# Patient Record
Sex: MALE | Race: WHITE | Employment: FULL TIME | URBAN - NONMETROPOLITAN AREA
[De-identification: names, ages, dates, MRNs, and addresses within clinical notes are randomized per-mention and may not be internally consistent; named-entity substitution may affect disease eponyms.]

---

## 2019-09-15 ENCOUNTER — HOSPITAL ENCOUNTER (EMERGENCY)
Facility: HOSPITAL | Age: 24
Discharge: HOME | End: 2019-09-15
Attending: NURSE PRACTITIONER | Admitting: NURSE PRACTITIONER
Payer: COMMERCIAL

## 2019-09-15 VITALS
SYSTOLIC BLOOD PRESSURE: 157 MMHG | OXYGEN SATURATION: 98 % | RESPIRATION RATE: 16 BRPM | TEMPERATURE: 98.6 F | DIASTOLIC BLOOD PRESSURE: 81 MMHG

## 2019-09-15 DIAGNOSIS — S61.411A LACERATION OF RIGHT HAND WITHOUT FOREIGN BODY, INITIAL ENCOUNTER: Primary | ICD-10-CM

## 2019-09-15 PROCEDURE — G0463 HOSPITAL OUTPT CLINIC VISIT: HCPCS

## 2019-09-15 PROCEDURE — 99213 OFFICE O/P EST LOW 20 MIN: CPT | Mod: Z6 | Performed by: NURSE PRACTITIONER

## 2019-09-15 ASSESSMENT — ENCOUNTER SYMPTOMS
CHILLS: 0
VOMITING: 0
SHORTNESS OF BREATH: 0
FEVER: 0
NAUSEA: 0
WOUND: 1
COLOR CHANGE: 0

## 2019-09-15 NOTE — ED AVS SNAPSHOT
[facility]  [address] [place] [address]  [place] [address]  Phone:  [phone]                                    Carlos Jaquez   MRN: [id number]    Department:  [facility]   Date of Visit:  9/15/2019           After Visit Summary Signature Page    I have received my discharge instructions, and my questions have been answered. I have discussed any challenges I see with this plan with the nurse or doctor.    ..........................................................................................................................................  Patient/Patient Representative Signature      ..........................................................................................................................................  Patient Representative Print Name and Relationship to Patient    ..................................................               ................................................  Date                                   Time    ..........................................................................................................................................  Reviewed by Signature/Title    ...................................................              ..............................................  Date                                               Time          22EPIC Rev 08/18

## 2019-09-16 NOTE — ED PROVIDER NOTES
History     Chief Complaint   Patient presents with     Laceration     rt hand laceration     HPI  Carlos Jaquez is a 24 year old male who presents with a friend for a hand laceration. He reports he was loading a fridge into a truck when he accidentally cut his hand on the fridge. Bleeding controlled. Denies CP, SOB, no blood thinner. Last tetanus 6/1/2009.     Allergies:  No Known Allergies    Problem List:    There are no active problems to display for this patient.       Past Medical History:    No past medical history on file.    Past Surgical History:    No past surgical history on file.    Family History:    No family history on file.    Social History:  Marital Status:  Single [1]  Social History     Tobacco Use     Smoking status: Never Smoker   Substance Use Topics     Alcohol use: No     Drug use: No        Medications:      No current outpatient medications on file.      Review of Systems   Constitutional: Negative for chills and fever.   Respiratory: Negative for shortness of breath.    Cardiovascular: Negative for chest pain.   Gastrointestinal: Negative for nausea and vomiting.   Skin: Positive for wound. Negative for color change.   All other systems reviewed and are negative.      Physical Exam   BP: 157/81  Heart Rate: 78  Temp: 98.6  F (37  C)  Resp: 16  SpO2: 98 %      Physical Exam   Constitutional: He is oriented to person, place, and time. He appears well-developed. No distress.   Cardiovascular: Normal rate and intact distal pulses.   Pulmonary/Chest: Effort normal.   Neurological: He is alert and oriented to person, place, and time. No cranial nerve deficit.   Skin: Skin is warm and dry. Capillary refill takes less than 2 seconds. No laceration noted. He is not diaphoretic.   Nursing note and vitals reviewed.          ED Course        Procedures               No results found for this or any previous visit (from the past 24 hour(s)).    Medications - No data to display    Assessments &  Plan (with Medical Decision Making)     I have reviewed the nursing notes.    I have reviewed the findings, diagnosis, plan and need for follow up with the patient.  Laceration of right hand without foreign body:  Superficial laceration to right hand between thumb and index finger as shown in the picture above.  Bleeding controlled.  No sutures required.  Patient clean hands with soap and water.  Applied antibacterial ointment and Band-Aid.  Advised patient to keep the area clean and dry.  He should cover it especially when he may expose the area to any.  No tetanus given today due to superficial nature of injury.  Advised patient he may want to see his primary care provider as he is due.  Discussed signs and symptoms of infection to the area.  Advised patient to return to emergency department for worsening or concerning symptoms.  Questions answered.  Patient verbalized understanding is agreeable with plan.    There are no discharge medications for this patient.      Final diagnoses:   Laceration of right hand without foreign body, initial encounter       9/15/2019   [facility]     Esteban, Ni, CNP  09/15/19 [id number]

## 2019-09-16 NOTE — ED TRIAGE NOTES
"C/o abrasion to R palm of hand by thumb. States they have been moving and a fridge was falling and he tried to catch it. States this occurred x5 hours ago and he wasn't going to come in but \"my girlfriend made me\".  "

## 2019-09-16 NOTE — DISCHARGE INSTRUCTIONS
Keep the area clean and dry.  Use a Band-Aid especially when you are working and may expose the area to dirt.    Follow-up with your doctor as needed.    Return to emergency department for worsening or concerning symptoms as discussed.